# Patient Record
Sex: MALE | Race: WHITE | NOT HISPANIC OR LATINO | Employment: FULL TIME | URBAN - METROPOLITAN AREA
[De-identification: names, ages, dates, MRNs, and addresses within clinical notes are randomized per-mention and may not be internally consistent; named-entity substitution may affect disease eponyms.]

---

## 2023-10-04 ENCOUNTER — OFFICE VISIT (OUTPATIENT)
Dept: URGENT CARE | Facility: URGENT CARE | Age: 73
End: 2023-10-04
Payer: COMMERCIAL

## 2023-10-04 ENCOUNTER — ANCILLARY PROCEDURE (OUTPATIENT)
Dept: GENERAL RADIOLOGY | Facility: CLINIC | Age: 73
End: 2023-10-04
Attending: PHYSICIAN ASSISTANT
Payer: COMMERCIAL

## 2023-10-04 VITALS
HEART RATE: 110 BPM | DIASTOLIC BLOOD PRESSURE: 75 MMHG | RESPIRATION RATE: 16 BRPM | OXYGEN SATURATION: 96 % | SYSTOLIC BLOOD PRESSURE: 120 MMHG | WEIGHT: 273.3 LBS | TEMPERATURE: 97.7 F

## 2023-10-04 DIAGNOSIS — R05.3 CHRONIC COUGH: ICD-10-CM

## 2023-10-04 DIAGNOSIS — R05.3 CHRONIC COUGH: Primary | ICD-10-CM

## 2023-10-04 PROCEDURE — 71046 X-RAY EXAM CHEST 2 VIEWS: CPT | Mod: TC | Performed by: RADIOLOGY

## 2023-10-04 PROCEDURE — 99203 OFFICE O/P NEW LOW 30 MIN: CPT | Performed by: PHYSICIAN ASSISTANT

## 2023-10-04 RX ORDER — DULAGLUTIDE 1.5 MG/.5ML
INJECTION, SOLUTION SUBCUTANEOUS
COMMUNITY
Start: 2023-09-21

## 2023-10-04 RX ORDER — INSULIN DEGLUDEC 100 U/ML
INJECTION, SOLUTION SUBCUTANEOUS
COMMUNITY

## 2023-10-04 RX ORDER — COLCHICINE 0.6 MG/1
TABLET ORAL
COMMUNITY
Start: 2023-07-10

## 2023-10-04 RX ORDER — ALLOPURINOL 300 MG/1
TABLET ORAL
COMMUNITY

## 2023-10-04 ASSESSMENT — PAIN SCALES - GENERAL: PAINLEVEL: NO PAIN (0)

## 2023-10-04 NOTE — PROGRESS NOTES
"Chief Complaint   Patient presents with    Cough     Cough, wheezing, eyes watering all summer but became \"intense\" on Saturday. Patient denies shortness of breath, chest pain, fever. Patient states his head is \"stuffed up\" and \"can't hear.\"       ASSESSMENT/PLAN:  Dakota was seen today for cough.    Diagnoses and all orders for this visit:    Chronic cough  -     XR Chest 2 Views; Future  -     albuterol (PROAIR HFA/PROVENTIL HFA/VENTOLIN HFA) 108 (90 Base) MCG/ACT inhaler; Inhale 2 puffs into the lungs every 6 hours as needed for shortness of breath or wheezing    Patient appears well at time of visit.  Reassuring exam and vitals normalized while sitting during the exam.  Cough is somewhat bronchospastic and will send in albuterol inhaler.  Discussed course of oral steroids but will hold off for now.  Follow-up with PCP if not improving  Work note printed    Preet Gibson PA-C      SUBJECTIVE:  Dakota is a 73 year old male who presents to urgent care with several months of cough.  He will also experience an stuffy sensation of his ears and nose at times.  Cough seem to get a little bit worse over the weekend.  No shortness of breath or chest pain.    ROS: Pertinent ROS neg other than the symptoms noted above in the HPI.     OBJECTIVE:  /75 (BP Location: Right arm, Patient Position: Sitting, Cuff Size: Adult Large)   Pulse 110   Temp 97.7  F (36.5  C) (Tympanic)   Resp 16   Wt 124 kg (273 lb 4.8 oz)   SpO2 96%    GENERAL: healthy, alert and no distress  EYES: Eyes grossly normal to inspection, PERRL and conjunctivae and sclerae normal  HENT: ear canals and TM's normal, nose and mouth without ulcers or lesions  NECK: no adenopathy, under  RESP: lungs clear to auscultation - no rales, rhonchi or wheezes, bronchospastic cough not  CV: regular rate and rhythm, normal S1 S2, no S3 or S4, no murmur, click or rub, no peripheral edema and peripheral pulses strong    DIAGNOSTICS    No results found for any " visits on 10/04/23.     Current Outpatient Medications   Medication    colchicine (COLCRYS) 0.6 MG tablet    TRESIBA FLEXTOUCH 100 UNIT/ML pen    TRULICITY 1.5 MG/0.5ML pen    allopurinol (ZYLOPRIM) 300 MG tablet    FLONASE NA    METROGEL 1 % EX GEL     No current facility-administered medications for this visit.      Patient Active Problem List   Diagnosis    Obstructive sleep apnea    Obesity      Past Medical History:   Diagnosis Date    Obstructive sleep apnea (adult) (pediatric) 5/06    CPAP     Past Surgical History:   Procedure Laterality Date    SURGICAL HISTORY OF -   1995    Lt Knee    SURGICAL HISTORY OF -   1987    Rt Foot - Plantar Fascitis     Family History   Problem Relation Age of Onset    Cancer Father         Liver Ca    Alzheimer Disease Mother         age 87     Social History     Tobacco Use    Smoking status: Some Days     Types: Cigars, cigarillos or filtered cigars     Passive exposure: Never    Smokeless tobacco: Never   Substance Use Topics    Alcohol use: Yes     Alcohol/week: 11.7 standard drinks of alcohol              The plan of care was discussed with the patient. They understand and agree with the course of treatment prescribed. A printed summary was given including instructions and medications.  The use of Dragon/[employer] dictation services may have been used to construct the content in this note; any grammatical or spelling errors are non-intentional. Please contact the author of this note directly if you are in need of any clarification.

## 2023-10-04 NOTE — PATIENT INSTRUCTIONS
Flonase/fluticasone nasal spray 2 sprays in each nostril once a day for 1 - 4 weeks.  This may take several days to become effective.   Consider saline nasal rinses    Zyrtec, allergra, claritin 1-2 x a day

## 2023-10-04 NOTE — LETTER
October 4, 2023      Dakota King  [address] [place] [id number]        To Whom It May Concern:    Dakota RESENDIZ Fernando  was seen on 10/4/23.  Please excuse him  until symptoms improve        Sincerely,        Preet Gibson PA-C

## 2023-10-06 ENCOUNTER — TELEPHONE (OUTPATIENT)
Dept: NURSING | Facility: CLINIC | Age: 73
End: 2023-10-06
Payer: COMMERCIAL

## 2023-10-06 NOTE — TELEPHONE ENCOUNTER
Telephone call  Patient calling he was seen ion urgent care on 10/4 2023 he states that the the  The urgent care doctor stated if he was not improving that he would order a inhaler for him.  The patient is  still coughing and not getting any better. So he is requesting a inhaler.  Routing to  provider. Still denies shortness of breath    Soledad Chaney RN   [facility] Nurse Advisor  11:11 AM 10/6/2023

## 2023-10-07 RX ORDER — ALBUTEROL SULFATE 90 UG/1
2 AEROSOL, METERED RESPIRATORY (INHALATION) EVERY 6 HOURS PRN
Qty: 18 G | Refills: 0 | Status: SHIPPED | OUTPATIENT
Start: 2023-10-07